# Patient Record
(demographics unavailable — no encounter records)

---

## 2024-11-06 NOTE — HISTORY OF PRESENT ILLNESS
[Public] : Public [Gen Ed: _____] : General Education class [unfilled] [TWNoteComboBox1] : 8th Grade [No Major Concerns] : No major concerns [FreeTextEntry1] : He is preparing for the special tests to get into high school   He is strugglign a little in math as he does not have a good teacher but he also struggles with the HW  The  does not use the electronic record and it is not easy to track the work.  He is disjointed   Mother wants to help him to get his work together.  [Major Illness] : no major illness [Major Injury] : no major injury [Surgery] : no surgery [Hospitalizations] : no hospitalizations [New Medications] : no new medication [New Allergies] : no new allergies [No Side Effects] : no side effects

## 2024-11-06 NOTE — PLAN
[Med Options Discussed: _____] : - Medication options discussed [unfilled] [Continue present medication regimen _____] : - Continue present medication regimen [unfilled] [Continue IEP] : - Continue services as presently provided for in the Individualized Education Program [ADHD EDU/Behav. Strategies (Gen)] : - Those educational and behavioral strategies known to be helpful to children with ADHD should be implemented in the classroom. [Instruction in Executive Function Skills] : - Direct, individualized instruction in executive function-related skills: i.e. task analysis, planning, organization, study strategies, memorization [Monitor Attention] : - [unfilled]'s attention skills will need to continue to be monitored [Home Behavior Techniques] : - Specific behavioral techniques that can be implemented at home were discussed [Cessation of screen use before bedtime] : - Ensure cessation of video screen use one hour before bedtime [Limit Screen Time] : - Limit screen time [[email or web address]] : - [email or web address] - Children and Adults with Attention Deficit Hyperactivity Disorder [[email or web address]] : - [email or web address] - information about learning disabilities [Follow-up visit (med treatment monitoring): ____] : - Follow-up visit in [unfilled]  to evaluate response to medication and monitoring of medication treatment [Accuracy] : Accuracy and reliability of clinical impressions [Findings (To Date)] : Findings from evaluation (to date) [Goals / Benefits] : Goals & potential benefits of treatment with medication, as well as the limitations of pharmacotherapy [Stimulants] : Potential benefits and limitations of treatment with stimulant medication.  Potential adverse events were also reviewed, including insomnia, reduced appetite, change in blood pressure or heart rate, headache, stomachache, slowing of growth, moodiness, and onset of tics [CSE / IEP] : Committee on Special Education (CSE) evaluations and Individualized Education Programs (IEP) [Family Questions] : Family's questions were addressed [Diet] : Evidence-based clinical information about diet [Sleep] : The importance of sleep and strategies to ensure adequate sleep [Media / Screen Time] : Importance of limiting electronics, media, and screen time [Exercise] : Regular exercise

## 2025-03-12 NOTE — HISTORY OF PRESENT ILLNESS
[Public] : Public [Gen Ed: _____] : General Education class [unfilled] [TWNoteComboBox1] : 8th Grade [No Major Concerns] : No major concerns [FreeTextEntry1] : MS 74  He is trying to go to a special high school.  Mother states that they are going to [employer] or might be going to [employer].    Math skills are good, but OLIVE skills are not great s per mother.    Mother states they will see how the school choice goes.    In school on a regular basis they have no complaints and things seem to be on track.  Mother states that the school is going well.  [employer] is not as great but it is not bad.  He can not take Chinese which is annoying as he already speaks this.    He is resistant to trying things but his mother is insisting that he does.  He was preparing for the test but now he is doing nothing.  He wants to do football.  [Major Illness] : no major illness [Major Injury] : no major injury [Surgery] : no surgery [Hospitalizations] : no hospitalizations [New Medications] : no new medication [New Allergies] : no new allergies [No Side Effects] : no side effects

## 2025-03-12 NOTE — PLAN
[Med Options Discussed: _____] : - Medication options discussed [unfilled] [Continue present medication regimen _____] : - Continue present medication regimen [unfilled] [ADHD EDU/Behav. Strategies (Gen)] : - Those educational and behavioral strategies known to be helpful to children with ADHD should be implemented in the classroom. [Instruction in Executive Function Skills] : - Direct, individualized instruction in executive function-related skills: i.e. task analysis, planning, organization, study strategies, memorization [Monitor Attention] : - [unfilled]'s attention skills will need to continue to be monitored [Home Behavior Techniques] : - Specific behavioral techniques that can be implemented at home were discussed [Cessation of screen use before bedtime] : - Ensure cessation of video screen use one hour before bedtime [Limit Screen Time] : - Limit screen time [[email or web address]] : - [email or web address] - Children and Adults with Attention Deficit Hyperactivity Disorder [[email or web address]] : - [email or web address] - information about learning disabilities [Follow-up visit (med treatment monitoring): ____] : - Follow-up visit in [unfilled]  to evaluate response to medication and monitoring of medication treatment [Accuracy] : Accuracy and reliability of clinical impressions [Findings (To Date)] : Findings from evaluation (to date) [Goals / Benefits] : Goals & potential benefits of treatment with medication, as well as the limitations of pharmacotherapy [Stimulants] : Potential benefits and limitations of treatment with stimulant medication.  Potential adverse events were also reviewed, including insomnia, reduced appetite, change in blood pressure or heart rate, headache, stomachache, slowing of growth, moodiness, and onset of tics [Family Questions] : Family's questions were addressed [Diet] : Evidence-based clinical information about diet [Sleep] : The importance of sleep and strategies to ensure adequate sleep [Media / Screen Time] : Importance of limiting electronics, media, and screen time [Exercise] : Regular exercise